# Patient Record
Sex: MALE | Race: WHITE | NOT HISPANIC OR LATINO | Employment: UNEMPLOYED | URBAN - METROPOLITAN AREA
[De-identification: names, ages, dates, MRNs, and addresses within clinical notes are randomized per-mention and may not be internally consistent; named-entity substitution may affect disease eponyms.]

---

## 2019-12-26 ENCOUNTER — HOSPITAL ENCOUNTER (EMERGENCY)
Facility: MEDICAL CENTER | Age: 24
End: 2019-12-26
Attending: EMERGENCY MEDICINE

## 2019-12-26 ENCOUNTER — APPOINTMENT (OUTPATIENT)
Dept: RADIOLOGY | Facility: MEDICAL CENTER | Age: 24
End: 2019-12-26
Attending: EMERGENCY MEDICINE

## 2019-12-26 VITALS
DIASTOLIC BLOOD PRESSURE: 80 MMHG | SYSTOLIC BLOOD PRESSURE: 121 MMHG | HEART RATE: 69 BPM | RESPIRATION RATE: 16 BRPM | OXYGEN SATURATION: 100 % | HEIGHT: 70 IN | WEIGHT: 136.24 LBS | BODY MASS INDEX: 19.51 KG/M2 | TEMPERATURE: 97.2 F

## 2019-12-26 DIAGNOSIS — J20.9 ACUTE BRONCHITIS, UNSPECIFIED ORGANISM: ICD-10-CM

## 2019-12-26 LAB — EKG IMPRESSION: NORMAL

## 2019-12-26 PROCEDURE — 71046 X-RAY EXAM CHEST 2 VIEWS: CPT

## 2019-12-26 PROCEDURE — 93005 ELECTROCARDIOGRAM TRACING: CPT | Performed by: EMERGENCY MEDICINE

## 2019-12-26 PROCEDURE — 99283 EMERGENCY DEPT VISIT LOW MDM: CPT

## 2019-12-26 PROCEDURE — 93005 ELECTROCARDIOGRAM TRACING: CPT

## 2019-12-26 SDOH — HEALTH STABILITY: MENTAL HEALTH: HOW OFTEN DO YOU HAVE A DRINK CONTAINING ALCOHOL?: NEVER

## 2019-12-26 ASSESSMENT — ENCOUNTER SYMPTOMS
WHEEZING: 0
SPUTUM PRODUCTION: 1
COUGH: 1
FEVER: 0
SHORTNESS OF BREATH: 0
DIZZINESS: 0
VOMITING: 0
HEMOPTYSIS: 1
NAUSEA: 0
ABDOMINAL PAIN: 0
BRUISES/BLEEDS EASILY: 0
HEADACHES: 0

## 2019-12-26 NOTE — ED TRIAGE NOTES
"Chief Complaint   Patient presents with   • Blood in Sputum   • Cough   • Chest Wall Pain       Patient ambulatory to triage with above complaint. States that he woke up this morning with above symptoms. Reports \"that every once in awhile I feel like my chest is on fire and I have blood in my sputum\". Patient not coughing in triage and wearing a mask.     Placed out in lobby and educated on triage process.       Blood Pressure: 121/76, Pulse: 71, Respiration: 14, Temperature: 36.2 °C (97.2 °F), Height: 177.8 cm (5' 10\"), Weight: 61.8 kg (136 lb 3.9 oz), BMI (Calculated): 19.55, BSA (Calculated): 1.7, Pulse Oximetry: 99 %, O2 Delivery: None (Room Air)    "

## 2019-12-26 NOTE — ED PROVIDER NOTES
"ED Provider Note    ED Provider Note    Primary care provider: No primary care provider on file.  Means of arrival: POV  History obtained from: patient  History limited by: None    CHIEF COMPLAINT  Chief Complaint   Patient presents with   • Blood in Sputum   • Cough   • Chest Wall Pain       HPI  Kaleb Robbins is a 24 y.o. male who presents to the Emergency Department with a friend with a chief complaint of blood in his sputum.  Patient states he was in his normal state of health until this morning, he woke up feeling like his \"chest was on fire\".  He had some coughing and noticed there was blood tinged sputum.  He denies fever.  He is a smoker.  He denies any other past medical history.  No ear pain.  He is otherwise been in his normal state of health.  Denies any recent epistaxis.  He is tolerating a regular diet.  No vomiting, diarrhea or constipation.  He has no chest pain at this time.  Chest wall pain reported at this time.    REVIEW OF SYSTEMS  Review of Systems   Constitutional: Negative for fever.   HENT: Positive for congestion. Negative for nosebleeds.    Respiratory: Positive for cough, hemoptysis and sputum production. Negative for shortness of breath and wheezing.    Cardiovascular: Positive for chest pain. Negative for leg swelling.   Gastrointestinal: Negative for abdominal pain, nausea and vomiting.   Neurological: Negative for dizziness and headaches.   Endo/Heme/Allergies: Does not bruise/bleed easily.       PAST MEDICAL HISTORY   Patient denies any past medical history.    SURGICAL HISTORY  patient denies any surgical history    SOCIAL HISTORY  Social History     Tobacco Use   • Smoking status: Current Every Day Smoker     Packs/day: 0.00   • Smokeless tobacco: Never Used   Substance Use Topics   • Alcohol use: Never     Frequency: Never   • Drug use: Never      Social History     Substance and Sexual Activity   Drug Use Never       FAMILY HISTORY  History reviewed. No pertinent family " "history.    CURRENT MEDICATIONS  Home Medications     Reviewed by Laquita Villavicencio R.N. (Registered Nurse) on 12/26/19 at 1246  Med List Status: Complete   Medication Last Dose Status        Patient John Taking any Medications                       ALLERGIES  No Known Allergies    PHYSICAL EXAM  VITAL SIGNS: /76   Pulse 71   Temp 36.2 °C (97.2 °F) (Temporal)   Resp 14   Ht 1.778 m (5' 10\")   Wt 61.8 kg (136 lb 3.9 oz)   SpO2 99%   BMI 19.55 kg/m²   Vitals reviewed.  Constitutional: Patient is oriented to person, place, and time. Appears well-developed and well-nourished. No distress.    Head: Normocephalic and atraumatic.   Ears: Normal external ears bilaterally.  TMs are normal bilaterally.  Mouth/Throat: Oropharynx is clear and moist, no exudates. No blood in the posterior oropharynx.  Eyes: Conjunctivae are normal.   Neck: Normal range of motion.   Cardiovascular: Normal rate, regular rhythm and normal heart sounds.   Pulmonary/Chest: Effort normal and breath sounds normal. No respiratory distress, no wheezes, rhonchi, or rales.   Abdominal: Soft. Bowel sounds are normal. There is no tenderness.   Musculoskeletal: No edema   Lymphadenopathy: No cervical adenopathy.   Neurological: No focal deficits.   Skin: Skin is warm and dry. No erythema. No pallor.   Psychiatric: Patient has a normal mood and affect.     RADIOLOGY  DX-CHEST-2 VIEWS   Final Result      No acute cardiopulmonary abnormality.        The radiologist's interpretation of all radiological studies have been reviewed by me.    COURSE & MEDICAL DECISION MAKING  Pertinent Labs & Imaging studies reviewed. (See chart for details)    Obtained and reviewed past medical records.  No prior encounters in our EMR.    1:17 PM - Patient seen and examined at bedside.  This is a well-appearing 24-year-old male who presents with normal vital signs with a history of cough, with blood-tinged sputum.  No fever.  No increased work of breathing.  His lungs " are clear.  Will obtain a chest x-ray for further evaluation.  I suspect upper airway irritation.  I do not have suspicion at this time, for pulmonary embolism.  He does not have tachycardia, epoxy or tachypnea.  He is not short of breath.    2:24 PM patient's reevaluated the bedside.  He is resting comfortably.  He is had no further blood-tinged sputum while here in the ED.  He is well-appearing with in no distress.  We discussed chest x-ray, which showed no evidence of inflammation, masses, pneumonia.  He is advised on staying hydrated.  If he has return of symptoms, fever, shortness of breath he should return for reevaluation.  Otherwise, at this point, I feel he can safely be discharged home.  He has normal vital signs as well on repeat exam and is in stable condition on discharge.    FINAL IMPRESSION  1. Acute bronchitis, unspecified organism

## 2024-05-22 NOTE — ED NOTES
Pt left prior to discharge instructions  
Pt to rm 77 from triage.  Agree with triage note.  Pt states coughed up sputum with blood this am.  Continued to clear his throat and had more blood.  Denies sob.  C/o chest burning t/o entire chest intermittently.  Smokes 1-2 packs cigarettes daily  
Imaging Studies/Medications